# Patient Record
Sex: FEMALE | Race: WHITE | NOT HISPANIC OR LATINO | Employment: UNEMPLOYED | URBAN - METROPOLITAN AREA
[De-identification: names, ages, dates, MRNs, and addresses within clinical notes are randomized per-mention and may not be internally consistent; named-entity substitution may affect disease eponyms.]

---

## 2017-09-14 ENCOUNTER — HISTORICAL (OUTPATIENT)
Dept: RADIOLOGY | Facility: HOSPITAL | Age: 42
End: 2017-09-14

## 2019-02-13 ENCOUNTER — HISTORICAL (OUTPATIENT)
Dept: RADIOLOGY | Facility: HOSPITAL | Age: 44
End: 2019-02-13

## 2019-06-06 ENCOUNTER — HISTORICAL (OUTPATIENT)
Dept: LAB | Facility: HOSPITAL | Age: 44
End: 2019-06-06

## 2019-06-06 LAB
ABS NEUT (OLG): 2.57
ALBUMIN SERPL-MCNC: 3.3 GM/DL (ref 3.4–5)
ALBUMIN/GLOB SERPL: 1.3 RATIO (ref 1.1–2)
ALP SERPL-CCNC: 81 UNIT/L (ref 46–116)
ALT SERPL-CCNC: 17 UNIT/L (ref 12–78)
ANISOCYTOSIS BLD QL SMEAR: NORMAL
AST SERPL-CCNC: 11 UNIT/L (ref 10–37)
BASOPHILS # BLD AUTO: 0.02 X10(3)/MCL
BASOPHILS NFR BLD AUTO: 0.4 %
BILIRUB SERPL-MCNC: 0.2 MG/DL (ref 0.2–1)
BILIRUBIN DIRECT+TOT PNL SERPL-MCNC: 0.07 MG/DL (ref 0–0.2)
BILIRUBIN DIRECT+TOT PNL SERPL-MCNC: 0.13 MG/DL
BUN SERPL-MCNC: 14 MG/DL (ref 7–18)
CALCIUM SERPL-MCNC: 8.2 MG/DL (ref 8.5–10.1)
CHLORIDE SERPL-SCNC: 109 MMOL/L (ref 98–107)
CO2 SERPL-SCNC: 25.9 MMOL/L (ref 21–32)
CREAT SERPL-MCNC: 0.8 MG/DL (ref 0.55–1.02)
EOSINOPHIL # BLD AUTO: 0.18 X10(3)/MCL
EOSINOPHIL NFR BLD AUTO: 3.6 %
ERYTHROCYTE [DISTWIDTH] IN BLOOD BY AUTOMATED COUNT: 16 %
GLOBULIN SER-MCNC: 2.6 GM/DL (ref 2.4–3.5)
GLUCOSE SERPL-MCNC: 85 MG/DL (ref 74–106)
HCT VFR BLD AUTO: 28.4 % (ref 34–46)
HGB BLD-MCNC: 9.1 GM/DL (ref 11.3–15.4)
IMM GRANULOCYTES # BLD AUTO: 0 10*3/UL (ref 0–0.1)
IMM GRANULOCYTES NFR BLD AUTO: 0 % (ref 0–1)
LYMPHOCYTES # BLD AUTO: 1.85 X10(3)/MCL
LYMPHOCYTES NFR BLD AUTO: 37.4 %
MCH RBC QN AUTO: 18.9 PG (ref 27–33)
MCHC RBC AUTO-ENTMCNC: 32 GM/DL (ref 32–35)
MCV RBC AUTO: 59 FL (ref 81–97)
MONOCYTES # BLD AUTO: 0.33 X10(3)/MCL
MONOCYTES NFR BLD AUTO: 6.7 %
NEUTROPHILS # BLD AUTO: 2.57 X10(3)/MCL
NEUTROPHILS NFR BLD AUTO: 51.9 %
OVALOCYTES BLD QL SMEAR: NORMAL
PLATELET # BLD AUTO: 224 X10(3)/MCL (ref 151–368)
PLATELET # BLD EST: ADEQUATE 10*3/UL
PMV BLD AUTO: ABNORMAL FL
POTASSIUM SERPL-SCNC: 3.9 MMOL/L (ref 3.5–5.1)
PROT SERPL-MCNC: 5.9 GM/DL (ref 6.4–8.2)
RBC # BLD AUTO: 4.81 X10(6)/MCL (ref 3.9–5)
SCHISTOCYTES BLD QL AUTO: SLIGHT
SODIUM SERPL-SCNC: 144 MMOL/L (ref 136–145)
TARGETS BLD QL SMEAR: SLIGHT
WBC # SPEC AUTO: 4.95 X10(3)/MCL (ref 3.4–9.2)

## 2019-07-08 ENCOUNTER — HISTORICAL (OUTPATIENT)
Dept: ADMINISTRATIVE | Facility: HOSPITAL | Age: 44
End: 2019-07-08

## 2019-07-09 LAB
ABS NEUT (OLG): 2.44 X10(3)/MCL (ref 2.1–9.2)
BASOPHILS # BLD AUTO: 0 X10(3)/MCL (ref 0–0.2)
BASOPHILS NFR BLD AUTO: 1 %
EOSINOPHIL # BLD AUTO: 0.2 X10(3)/MCL (ref 0–0.9)
EOSINOPHIL NFR BLD AUTO: 4 %
ERYTHROCYTE [DISTWIDTH] IN BLOOD BY AUTOMATED COUNT: 16 % (ref 11.5–17)
HCT VFR BLD AUTO: 28.4 % (ref 37–47)
HGB BLD-MCNC: 8.8 GM/DL (ref 12–16)
LYMPHOCYTES # BLD AUTO: 2 X10(3)/MCL (ref 0.6–4.6)
LYMPHOCYTES NFR BLD AUTO: 39 %
MCH RBC QN AUTO: 18.6 PG (ref 27–31)
MCHC RBC AUTO-ENTMCNC: 31 GM/DL (ref 33–36)
MCV RBC AUTO: 60.2 FL (ref 80–94)
MONOCYTES # BLD AUTO: 0.4 X10(3)/MCL (ref 0.1–1.3)
MONOCYTES NFR BLD AUTO: 7 %
NEUTROPHILS # BLD AUTO: 2.44 X10(3)/MCL (ref 2.1–9.2)
NEUTROPHILS NFR BLD AUTO: 48 %
PLATELET # BLD AUTO: 192 X10(3)/MCL (ref 130–400)
PMV BLD AUTO: ABNORMAL FL (ref 7.4–10.4)
RBC # BLD AUTO: 4.72 X10(6)/MCL (ref 4.2–5.4)
WBC # SPEC AUTO: 5 X10(3)/MCL (ref 4.5–11.5)

## 2020-03-03 ENCOUNTER — HISTORICAL (OUTPATIENT)
Dept: RADIOLOGY | Facility: HOSPITAL | Age: 45
End: 2020-03-03

## 2020-08-17 ENCOUNTER — HISTORICAL (OUTPATIENT)
Dept: RADIOLOGY | Facility: HOSPITAL | Age: 45
End: 2020-08-17

## 2021-06-07 ENCOUNTER — HISTORICAL (OUTPATIENT)
Dept: RADIOLOGY | Facility: HOSPITAL | Age: 46
End: 2021-06-07

## 2022-04-10 ENCOUNTER — HISTORICAL (OUTPATIENT)
Dept: ADMINISTRATIVE | Facility: HOSPITAL | Age: 47
End: 2022-04-10
Payer: COMMERCIAL

## 2022-04-30 VITALS
BODY MASS INDEX: 20.96 KG/M2 | HEIGHT: 59 IN | WEIGHT: 104 LBS | DIASTOLIC BLOOD PRESSURE: 72 MMHG | SYSTOLIC BLOOD PRESSURE: 111 MMHG

## 2022-06-21 ENCOUNTER — HOSPITAL ENCOUNTER (OUTPATIENT)
Dept: RADIOLOGY | Facility: HOSPITAL | Age: 47
Discharge: HOME | End: 2022-06-21
Payer: COMMERCIAL

## 2022-06-21 ENCOUNTER — HOSPITAL ENCOUNTER (OUTPATIENT)
Dept: RADIOLOGY | Facility: HOSPITAL | Age: 47
Discharge: HOME | End: 2022-06-21
Attending: OBSTETRICS & GYNECOLOGY
Payer: COMMERCIAL

## 2022-06-21 DIAGNOSIS — N63.0 LUMP OR MASS IN BREAST: ICD-10-CM

## 2022-06-21 DIAGNOSIS — N64.4 MASTODYNIA: ICD-10-CM

## 2022-06-21 PROCEDURE — 77062 BREAST TOMOSYNTHESIS BI: CPT | Mod: TC

## 2022-06-21 PROCEDURE — 76642 ULTRASOUND BREAST LIMITED: CPT | Mod: 26,LT,, | Performed by: RADIOLOGY

## 2022-06-21 PROCEDURE — 77062 MAMMO DIGITAL DIAGNOSTIC BILAT WITH TOMO: ICD-10-PCS | Mod: 26,,, | Performed by: RADIOLOGY

## 2022-06-21 PROCEDURE — 77066 MAMMO DIGITAL DIAGNOSTIC BILAT WITH TOMO: ICD-10-PCS | Mod: 26,,, | Performed by: RADIOLOGY

## 2022-06-21 PROCEDURE — 77066 DX MAMMO INCL CAD BI: CPT | Mod: 26,,, | Performed by: RADIOLOGY

## 2022-06-21 PROCEDURE — 77062 BREAST TOMOSYNTHESIS BI: CPT | Mod: 26,,, | Performed by: RADIOLOGY

## 2022-06-21 PROCEDURE — 76642 US BREAST LEFT LIMITED: ICD-10-PCS | Mod: 26,LT,, | Performed by: RADIOLOGY

## 2022-06-21 PROCEDURE — 76642 ULTRASOUND BREAST LIMITED: CPT | Mod: TC,LT

## 2022-06-30 RX ORDER — FOLIC ACID 1 MG/1
1000 TABLET ORAL DAILY
COMMUNITY
Start: 2022-05-03 | End: 2023-01-16 | Stop reason: SDUPTHER

## 2022-06-30 RX ORDER — GALCANEZUMAB 120 MG/ML
INJECTION, SOLUTION SUBCUTANEOUS
COMMUNITY
Start: 2022-05-31 | End: 2023-01-12 | Stop reason: SDUPTHER

## 2022-06-30 RX ORDER — CALCIUM CARBONATE/VITAMIN D3 600 MG-10
30 TABLET ORAL
COMMUNITY
Start: 2021-08-04 | End: 2023-12-18

## 2022-06-30 RX ORDER — ESTRADIOL 2 MG/1
TABLET ORAL
COMMUNITY
Start: 2022-06-20

## 2022-08-04 ENCOUNTER — OFFICE VISIT (OUTPATIENT)
Dept: HEMATOLOGY/ONCOLOGY | Facility: CLINIC | Age: 47
End: 2022-08-04
Payer: COMMERCIAL

## 2022-08-04 VITALS
SYSTOLIC BLOOD PRESSURE: 104 MMHG | TEMPERATURE: 98 F | DIASTOLIC BLOOD PRESSURE: 60 MMHG | OXYGEN SATURATION: 99 % | WEIGHT: 106.19 LBS | RESPIRATION RATE: 20 BRPM | HEART RATE: 68 BPM | HEIGHT: 58 IN | BODY MASS INDEX: 22.29 KG/M2

## 2022-08-04 DIAGNOSIS — D56.9 THALASSEMIA, UNSPECIFIED TYPE: Primary | ICD-10-CM

## 2022-08-04 PROCEDURE — 3078F PR MOST RECENT DIASTOLIC BLOOD PRESSURE < 80 MM HG: ICD-10-PCS | Mod: CPTII,,, | Performed by: NURSE PRACTITIONER

## 2022-08-04 PROCEDURE — 3008F BODY MASS INDEX DOCD: CPT | Mod: CPTII,,, | Performed by: NURSE PRACTITIONER

## 2022-08-04 PROCEDURE — 99214 OFFICE O/P EST MOD 30 MIN: CPT | Mod: ,,, | Performed by: NURSE PRACTITIONER

## 2022-08-04 PROCEDURE — 3078F DIAST BP <80 MM HG: CPT | Mod: CPTII,,, | Performed by: NURSE PRACTITIONER

## 2022-08-04 PROCEDURE — 3074F SYST BP LT 130 MM HG: CPT | Mod: CPTII,,, | Performed by: NURSE PRACTITIONER

## 2022-08-04 PROCEDURE — 3074F PR MOST RECENT SYSTOLIC BLOOD PRESSURE < 130 MM HG: ICD-10-PCS | Mod: CPTII,,, | Performed by: NURSE PRACTITIONER

## 2022-08-04 PROCEDURE — 1159F PR MEDICATION LIST DOCUMENTED IN MEDICAL RECORD: ICD-10-PCS | Mod: CPTII,,, | Performed by: NURSE PRACTITIONER

## 2022-08-04 PROCEDURE — 3008F PR BODY MASS INDEX (BMI) DOCUMENTED: ICD-10-PCS | Mod: CPTII,,, | Performed by: NURSE PRACTITIONER

## 2022-08-04 PROCEDURE — 99214 PR OFFICE/OUTPT VISIT, EST, LEVL IV, 30-39 MIN: ICD-10-PCS | Mod: ,,, | Performed by: NURSE PRACTITIONER

## 2022-08-04 PROCEDURE — 1159F MED LIST DOCD IN RCRD: CPT | Mod: CPTII,,, | Performed by: NURSE PRACTITIONER

## 2022-08-04 NOTE — PROGRESS NOTES
[facility]    PATIENT: Jarred Jason  MRN: [id number]  DATE: 8/4/2022    Diagnosis: Beta Thalessemia    Chief Complaint: Results    Oncologic History:    Iron deficiency anemia--   Bone marrow biopsy 7/9/19-- 35% cellularity with all lines maturing. absent sparse iron, cytogenetic : normal.  Microcytic anemia-- with headaches, PICA, very poor nutrition, and history of heavy menses in past.    Beta Thalassemia MINOR- by electrophoresis 7/23/19    Current Therapy: Folic acid 1 mg added 7/23/19    Treatment History: Injectafer 750 mg IV weekly x 2 with solumedrol IVP-- initiated 6/11/19, 6/18/19, 7/25/19, 8/1/19    Clinical History:   Mrs. Jason had a hysterectomy in 2002 ( at age 26) for endometriosis and very heavy menses. She was given oral iron and prenatal vitamin but these were stopped when she developed kidney stones. She had two children prior to her surgery with no hemorrhaging noted.    Over the years, she became dependent on Dr. Pepper -- 4-5 a day and would raise her family with missed meals, constant activity, but no bleeding.  One year ago she began to have headaches (generalized, every other day, and moderately severe). The past year the headaches are daily and much worse in intensity but still generalized.. Her diet is poor and she eats maybe one small meal a day.    Since April, 2019, she became tired and unable to continue with household chores. Bone marrow biopsy showed iron deficiency but her nutrition (very poor) is contributing. Migraine headaches and stress also contribute.  She was found to have iron deficiency but a fully functioning bone marrow. She was also found to have beta thalassemia.  EGD 9/30/19 showed non erosive proximal gastritis and HPylori. Treatment was initiated. colonoscopy on same day found a 1.5 cm ascending colon polyp.     Subjective:     Interval History:  8/4/2022:  Ms. Jason presents to clinic today for annual evaluation for Beta  Thalassemia. Iron deficiency anemia.   Continues to take folic acid 1 mg daily.   States she has been doing well. Good energy, no SOB, weakness, dizziness, rapid heart beat or bleeding.     8/2/2021: Anesia for follow-up of beta thalassemia. She also had a history of iron deficiency anemia which is resolved without any ongoing losses. She has a history of some back pain in the lumbar area which has been present for about 2 weeks. She also notes a intermittent pins-and-needles sensation and some ankle pain in her feet. She does admit to wearing flip-flops most of the time. Otherwise she feels well. Her migraines are much improved. She is taking her folic acid.    1/28/2021: Patient presents to clinic for follow-up visit today. Overall feels well. States she is compliant with folic acid 1 mg daily, tolerating well. States SOB with exertion, unchanged from previous. She denies any abnormal bruising or bleeding. Denies fever, chills, night sweats. Appetite remains fair and weight stable. Laboratory testing reviewed and discussed. No further questions or concerns.    Past Medical History:   Diagnosis Date    Migraines      Past Surgical History:   Procedure Laterality Date    AUGMENTATION OF BREAST      BONE MARROW BIOPSY W/ ASPIRATION  07/08/2019    COLONOSCOPY  09/30/2019    ESOPHAGOGASTRODUODENOSCOPY  09/30/2019    with biopsy    HYSTERECTOMY       Family History   Problem Relation Age of Onset    Hypertension Mother     Diabetes Mother     Diabetes type II Brother     Hypertension Brother        Social History:  reports that she has quit smoking. Her smoking use included cigarettes. She has never used smokeless tobacco. She reports previous alcohol use. She reports that she does not use drugs.    Allergies:  Review of patient's allergies indicates:  No Known Allergies    Medications:  Current Outpatient Medications   Medication Sig Dispense Refill    EMGALITY  mg/mL PnIj INJECT 120 MG UNDER THE SKIN  ONCE A MONTH      estradioL (ESTRACE) 2 MG tablet       folic acid (FOLVITE) 1 MG tablet Take 1,000 mcg by mouth once daily.      vitamin E 100 UNIT capsule Take 100 Int'l Units by mouth.      zinc gluconate 30 mg Tab Take 30 mg by mouth.      biotin 300 mcg Tab Take 300 mcg by mouth.       No current facility-administered medications for this visit.       Review of Systems:   Constitutional: no weight gain, no weight loss, no fatigue, no fever, no chills, no weakness, no trouble sleeping.  Eyes: no vision loss/changes, no pain, no redness, no blurry or double vision,   Head: no headache, no head injury, no neck pain.   Neck: no lumps, no swollen glands, no stiffness.  Ears: no decreased hearing, no ringing, no earache, no drainage.   Nose: no stuffiness, no discharge, no itching, no hay fever, no nosebleeds,   Throat: no sore tongue, no dry mouth, no sore throat, no hoarseness, no thrush, no non-healing sores.  Cardiovascular: no chest pain or discomfort, no tightness, no palpitations, no SOB with activity, no difficulty breathing while supine, no swelling, no sudden awakening from sleep with SOB.  Vascular: no calf pain with walking, no leg cramping.  Respiratory: no cough, no sputum, no coughing up blood, no SOB, no wheezing, no painful breathing.  Gastrointestinal: no swallowing difficulty, no heartburn, no change in appetite, no nausea, no change in bowel habits, no rectal bleeding, no constipation, no diarrhea, no yellow eyes or skin. no abdominal pain  Urinary: no frequency, no urgency, no burning or pain, no blood in urine, no change in urinary strength.  Musculoskeletal: no muscle or joint pain, no stiffness, no back pain, no redness of joints, no swelling of joints.   Skin: no rashes, no lumps, no itching, no dryness, color normal for ethnicity, no hair or nail changes.  Neurologic: no dizziness, no fainting, no seizures, no weakness, no numbness, tingling,in feet at times no tremors.  Psychiatric: no  "nervousness, no stress, no depression, no memory loss.  Endocrine: no heat or cold intolerance, no sweating, no frequent urination, no thirst, no change in appetite.  Hematologic: no ease of bruising, no ease of bleeding.     ECOG Performance Status: 0   Objective:      Vitals:   Vitals:    08/04/22 1302   BP: 104/60   BP Location: Right arm   Patient Position: Sitting   BP Method: Medium (Automatic)   Pulse: 68   Resp: 20   Temp: 98.2 °F (36.8 °C)   TempSrc: Oral   SpO2: 99%   Weight: 48.2 kg (106 lb 3.2 oz)   Height: 4' 10" (1.473 m)     BMI: Body mass index is 22.2 kg/m².    Physical Exam:  General: Alert and oriented. Well nourished, groomed. NAD  Eye: PERRL, clear conjunctiva, anicteric sclera.   HENT: Moist oropharynx. No petechiae.   Neck: no thyromegaly or lymphadenopathy  Respiratory: clear to auscultation bilaterally  Cardiovascular: regular rate and rhythm without murmurs, gallops or rubs  Gastrointestinal: soft, non-tender, non-distended with normal bowel sounds, without masses to palpation  Genitourinary: no CVA tenderness to palpation  Musculoskeletal: full range of motion of all extremities/spine without limitation or discomfort  Integumentary: no rashes or skin lesions present  Neurologic: cranial nerves intact, no signs of peripheral neurological deficit, motor/sensory function intact  Lymphatics: No cervical, axillary, or inguinal nodes.    Laboratory results:   8/1/2022: WBC 5.20, Hgb 9.9, HCT 29.5, MCV 60.1, MCH 20.2, polt 265, ANC 2.7, calcium 8.3     Assessment:   1. Thalassemia D56.9   Patient feels well. Hemoglobin/HCT(9.9/29.5) stable range for patient. Her MCV is stable at 60.1  Continued folic acid 1 mg daily.     2. Mild hypocalcemia. Patient take a 500 mg tablet of calcium. Advised she can take 2 500 mg tablets daily.      Plan:   # Continue folic acid one mg daily.   Continue calcium, one tablet twice daily.     RTC one year with labs done prior, advised to call if any concerns in the " interim.       Norma Ray, NP-C

## 2022-12-19 ENCOUNTER — OFFICE VISIT (OUTPATIENT)
Dept: NEUROLOGY | Facility: CLINIC | Age: 47
End: 2022-12-19
Payer: COMMERCIAL

## 2022-12-19 VITALS
BODY MASS INDEX: 23.09 KG/M2 | WEIGHT: 110 LBS | SYSTOLIC BLOOD PRESSURE: 104 MMHG | DIASTOLIC BLOOD PRESSURE: 68 MMHG | HEIGHT: 58 IN

## 2022-12-19 DIAGNOSIS — Z65.9 OTHER SOCIAL STRESSOR: ICD-10-CM

## 2022-12-19 DIAGNOSIS — G47.00 INSOMNIA, UNSPECIFIED TYPE: ICD-10-CM

## 2022-12-19 DIAGNOSIS — G43.709 CHRONIC MIGRAINE WITHOUT AURA WITHOUT STATUS MIGRAINOSUS, NOT INTRACTABLE: Primary | ICD-10-CM

## 2022-12-19 PROCEDURE — 3074F PR MOST RECENT SYSTOLIC BLOOD PRESSURE < 130 MM HG: ICD-10-PCS | Mod: CPTII,S$GLB,, | Performed by: NURSE PRACTITIONER

## 2022-12-19 PROCEDURE — 3078F DIAST BP <80 MM HG: CPT | Mod: CPTII,S$GLB,, | Performed by: NURSE PRACTITIONER

## 2022-12-19 PROCEDURE — 3074F SYST BP LT 130 MM HG: CPT | Mod: CPTII,S$GLB,, | Performed by: NURSE PRACTITIONER

## 2022-12-19 PROCEDURE — 99999 PR PBB SHADOW E&M-EST. PATIENT-LVL III: ICD-10-PCS | Mod: PBBFAC,,, | Performed by: NURSE PRACTITIONER

## 2022-12-19 PROCEDURE — 99214 OFFICE O/P EST MOD 30 MIN: CPT | Mod: S$GLB,,, | Performed by: NURSE PRACTITIONER

## 2022-12-19 PROCEDURE — 1159F MED LIST DOCD IN RCRD: CPT | Mod: CPTII,S$GLB,, | Performed by: NURSE PRACTITIONER

## 2022-12-19 PROCEDURE — 3078F PR MOST RECENT DIASTOLIC BLOOD PRESSURE < 80 MM HG: ICD-10-PCS | Mod: CPTII,S$GLB,, | Performed by: NURSE PRACTITIONER

## 2022-12-19 PROCEDURE — 3008F PR BODY MASS INDEX (BMI) DOCUMENTED: ICD-10-PCS | Mod: CPTII,S$GLB,, | Performed by: NURSE PRACTITIONER

## 2022-12-19 PROCEDURE — 99214 PR OFFICE/OUTPT VISIT, EST, LEVL IV, 30-39 MIN: ICD-10-PCS | Mod: S$GLB,,, | Performed by: NURSE PRACTITIONER

## 2022-12-19 PROCEDURE — 99999 PR PBB SHADOW E&M-EST. PATIENT-LVL III: CPT | Mod: PBBFAC,,, | Performed by: NURSE PRACTITIONER

## 2022-12-19 PROCEDURE — 3008F BODY MASS INDEX DOCD: CPT | Mod: CPTII,S$GLB,, | Performed by: NURSE PRACTITIONER

## 2022-12-19 PROCEDURE — 1159F PR MEDICATION LIST DOCUMENTED IN MEDICAL RECORD: ICD-10-PCS | Mod: CPTII,S$GLB,, | Performed by: NURSE PRACTITIONER

## 2022-12-19 RX ORDER — MIRTAZAPINE 15 MG/1
15 TABLET, FILM COATED ORAL NIGHTLY
Qty: 30 TABLET | Refills: 5 | Status: SHIPPED | OUTPATIENT
Start: 2022-12-19 | End: 2023-01-09

## 2022-12-19 RX ORDER — SUMATRIPTAN SUCCINATE 100 MG/1
100 TABLET ORAL
Qty: 9 TABLET | Refills: 5 | Status: SHIPPED | OUTPATIENT
Start: 2022-12-19 | End: 2023-11-06 | Stop reason: SDUPTHER

## 2022-12-19 NOTE — PROGRESS NOTES
Established Patient   SUBJECTIVE:  Patient ID: Jarred aJson 47 y.o.  Past Medical History:   Diagnosis Date    Migraines      Past Surgical History:   Procedure Laterality Date    AUGMENTATION OF BREAST      BONE MARROW BIOPSY W/ ASPIRATION  07/08/2019    COLONOSCOPY  09/30/2019    ESOPHAGOGASTRODUODENOSCOPY  09/30/2019    with biopsy    HYSTERECTOMY       Family History   Problem Relation Age of Onset    Hypertension Mother     Diabetes Mother     Diabetes type II Brother     Hypertension Brother      Social History     Socioeconomic History    Marital status:    Tobacco Use    Smoking status: Former     Types: Cigarettes    Smokeless tobacco: Never   Substance and Sexual Activity    Alcohol use: Not Currently     Comment: Not often     Drug use: Never     Review of patient's allergies indicates:  No Known Allergies    Chief Complaint: Migraine f/u    History of Present Illness:    Pt states she gets about 3 migraines a month. States she gets a throbbing-pounding pain in the back of her head that radiates all over head and behind her eyes. Pt has n/v, phono-photosensitivity.     Taking Excedrin migraine prn, which hasnt been helpful with  relieving pain    States she is under a lot of stress, pt is crying. States her mom and  have been ill and it's a lot for her.     Having difficulty sleeping      Review of Systems - as per HPI, otherwise pertinent systems review is negative.      Current Medications:    Current Outpatient Medications:     EMGALITY  mg/mL PnIj, INJECT 120 MG UNDER THE SKIN ONCE A MONTH, Disp: , Rfl:     estradioL (ESTRACE) 2 MG tablet, , Disp: , Rfl:     folic acid (FOLVITE) 1 MG tablet, Take 1,000 mcg by mouth once daily., Disp: , Rfl:     vitamin E 100 UNIT capsule, Take 100 Int'l Units by mouth., Disp: , Rfl:     zinc gluconate 30 mg Tab, Take 30 mg by mouth., Disp: , Rfl:     biotin 300 mcg Tab, Take 300 mcg by mouth., Disp: , Rfl:     OBJECTIVE:  Vitals:  /68  "  Ht 4' 10" (1.473 m)   Wt 49.9 kg (110 lb)   BMI 22.99 kg/m²      Physical Exam:  Constitutional: she appears well-developed and well-nourished. she is well groomed.    Head: Normocephalic and atraumatic  Resp: Respiratory effort is normal.   Cardiac: RRR  Musculoskeletal: Normal range of motion.   Skin: Skin is warm and dry.  Psychiatric: Normal mood and affect.     Neuro exam:    The patient is alert and oriented   Normal attention and concentration  Speech is normal   Visual fields are full to confrontation testing.   CN 8 - hearing is grossly normal  Motor - grossly normal  Gait - unassisted; posture upright. gait is steady with normal steps      Review of Data:   Prior notes reviewed   Labs:  No visits with results within 6 Month(s) from this visit.   Latest known visit with results is:   Historical on 07/08/2019   Component Date Value Ref Range Status    Abs Neut 07/09/2019 2.44  2.10 - 9.20 x10(3)/mcL Final    MCV 07/09/2019 60.2 (L)  80.0 - 94.0 fL Final    Hct 07/09/2019 28.4 (L)  37.0 - 47.0 % Final    MCHC 07/09/2019 31.0 (L)  33.0 - 36.0 gm/dL Final    MCH 07/09/2019 18.6 (L)  27.0 - 31.0 pg Final    Hgb 07/09/2019 8.8 (L)  12.0 - 16.0 gm/dL Final    MPV 07/09/2019 n/a  7.4 - 10.4 fL Final    RBC 07/09/2019 4.72  4.20 - 5.40 x10(6)/mcL Final    RDW 07/09/2019 16.0  11.5 - 17.0 % Final    WBC 07/09/2019 5.0  4.5 - 11.5 x10(3)/mcL Final    Platelet 07/09/2019 192  130 - 400 x10(3)/mcL Final    Eos # 07/09/2019 0.2  0.0 - 0.9 x10(3)/mcL Final    Lymph # 07/09/2019 2.0  0.6 - 4.6 x10(3)/mcL Final    Basophil % 07/09/2019 1  % Final    Baso # 07/09/2019 0.0  0.0 - 0.2 x10(3)/mcL Final    Mono # 07/09/2019 0.4  0.1 - 1.3 x10(3)/mcL Final    Neut # 07/09/2019 2.44  2.10 - 9.20 x10(3)/mcL Final    Eos % 07/09/2019 4  % Final    Lymph % 07/09/2019 39  % Final    Mono % 07/09/2019 7  % Final    Neut % 07/09/2019 48  % Final     Imaging:  No results found for this or any previous visit.      ASSESSMENT " /PLAN:    Problem List Items Addressed This Visit          Neuro    Chronic migraine without aura without status migrainosus, not intractable - Primary    Continue Emgality monthly    PRN Sumatriptan         Other    Insomnia    Start Remeron 15mg, 1 Qhs  Anticipated response and possible side effects of new medication discussed. Pt denies questions at this time. Instructed pt to call with any questions or concerns       Other social stressor         Items discussed include acute and/or chronic neurological, sleep, or other issues and their attendant differential diagnoses.  Potential for additional testing, treatment options, and prognosis also discussed.   Questions and concerns were sought and answered to the patient's stated verbal satisfaction.    The patient verbalizes understanding and agreement with the above stated treatment plan.     __single dx __*_multiple issues/ diagnoses  __ low _*_mod ___ high complexity of data  __low _*_mod ___ high risks     Medical Decision Making (MDM) used for CPT choice:  ___low  _*__moderate  ____high       CHEIKH Padilla  [facility]  [phone]

## 2023-01-09 ENCOUNTER — TELEPHONE (OUTPATIENT)
Dept: NEUROLOGY | Facility: CLINIC | Age: 48
End: 2023-01-09
Payer: COMMERCIAL

## 2023-01-09 DIAGNOSIS — G47.00 INSOMNIA, UNSPECIFIED TYPE: Primary | ICD-10-CM

## 2023-01-09 RX ORDER — MIRTAZAPINE 30 MG/1
30 TABLET, FILM COATED ORAL NIGHTLY
Qty: 30 TABLET | Refills: 0 | Status: SHIPPED | OUTPATIENT
Start: 2023-01-09 | End: 2023-02-20 | Stop reason: SDUPTHER

## 2023-01-09 NOTE — TELEPHONE ENCOUNTER
Pt states she was put on this medication to help with sleeping and anxiety.   Pt reports it has helped with anxiety, but not sleep.  Reports she has been waking up at 3 or 4 in the morning since November 2022.  Pt states seeking advise.   Reports she is exhausted and agitated.     Medication: Mirtazapine 15 mg    Pharmacy: [facility] / [place]    Last Appointment: 12/19/22    Next Appointment: 12/18/23

## 2023-01-09 NOTE — TELEPHONE ENCOUNTER
Called pt; RICHIE left    Instructed pt to increase Remeron to 15mg, 2 Qhs  Will send prescription for Remeron 30mg, 1 Qhs to pharmacy

## 2023-01-12 DIAGNOSIS — G43.709 CHRONIC MIGRAINE WITHOUT AURA WITHOUT STATUS MIGRAINOSUS, NOT INTRACTABLE: Primary | ICD-10-CM

## 2023-01-12 RX ORDER — GALCANEZUMAB 120 MG/ML
INJECTION, SOLUTION SUBCUTANEOUS
Qty: 1 ML | Refills: 5 | Status: SHIPPED | OUTPATIENT
Start: 2023-01-12 | End: 2023-07-03

## 2023-01-16 DIAGNOSIS — D56.9 THALASSEMIA, UNSPECIFIED TYPE: Primary | ICD-10-CM

## 2023-01-18 RX ORDER — FOLIC ACID 1 MG/1
1000 TABLET ORAL DAILY
Qty: 30 TABLET | Refills: 5 | Status: SHIPPED | OUTPATIENT
Start: 2023-01-18

## 2023-02-20 DIAGNOSIS — G47.00 INSOMNIA, UNSPECIFIED TYPE: ICD-10-CM

## 2023-02-20 RX ORDER — MIRTAZAPINE 30 MG/1
30 TABLET, FILM COATED ORAL NIGHTLY
Qty: 30 TABLET | Refills: 0 | Status: SHIPPED | OUTPATIENT
Start: 2023-02-20 | End: 2023-03-30 | Stop reason: SDUPTHER

## 2023-03-30 DIAGNOSIS — G47.00 INSOMNIA, UNSPECIFIED TYPE: ICD-10-CM

## 2023-03-30 RX ORDER — MIRTAZAPINE 30 MG/1
30 TABLET, FILM COATED ORAL NIGHTLY
Qty: 30 TABLET | Refills: 5 | Status: SHIPPED | OUTPATIENT
Start: 2023-03-30 | End: 2023-12-18

## 2023-06-12 DIAGNOSIS — Z12.31 ENCOUNTER FOR SCREENING MAMMOGRAM FOR MALIGNANT NEOPLASM OF BREAST: Primary | ICD-10-CM

## 2023-06-12 DIAGNOSIS — Z00.00 WELLNESS EXAMINATION: ICD-10-CM

## 2023-06-27 ENCOUNTER — HOSPITAL ENCOUNTER (OUTPATIENT)
Dept: RADIOLOGY | Facility: HOSPITAL | Age: 48
Discharge: HOME | End: 2023-06-27
Attending: OBSTETRICS & GYNECOLOGY
Payer: COMMERCIAL

## 2023-06-27 DIAGNOSIS — Z00.00 WELLNESS EXAMINATION: ICD-10-CM

## 2023-06-27 DIAGNOSIS — Z12.31 ENCOUNTER FOR SCREENING MAMMOGRAM FOR MALIGNANT NEOPLASM OF BREAST: ICD-10-CM

## 2023-06-27 PROCEDURE — 77063 BREAST TOMOSYNTHESIS BI: CPT | Mod: 26,,, | Performed by: RADIOLOGY

## 2023-06-27 PROCEDURE — 77067 SCR MAMMO BI INCL CAD: CPT | Mod: TC

## 2023-06-27 PROCEDURE — 77063 MAMMO DIGITAL SCREENING BILAT WITH TOMO: ICD-10-PCS | Mod: 26,,, | Performed by: RADIOLOGY

## 2023-06-27 PROCEDURE — 77067 SCR MAMMO BI INCL CAD: CPT | Mod: 26,,, | Performed by: RADIOLOGY

## 2023-06-27 PROCEDURE — 77067 MAMMO DIGITAL SCREENING BILAT WITH TOMO: ICD-10-PCS | Mod: 26,,, | Performed by: RADIOLOGY

## 2023-06-30 DIAGNOSIS — G43.709 CHRONIC MIGRAINE WITHOUT AURA WITHOUT STATUS MIGRAINOSUS, NOT INTRACTABLE: ICD-10-CM

## 2023-07-03 RX ORDER — GALCANEZUMAB 120 MG/ML
INJECTION, SOLUTION SUBCUTANEOUS
Qty: 1 ML | Refills: 5 | Status: SHIPPED | OUTPATIENT
Start: 2023-07-03 | End: 2023-12-18 | Stop reason: SDUPTHER

## 2023-11-06 ENCOUNTER — TELEPHONE (OUTPATIENT)
Dept: NEUROLOGY | Facility: CLINIC | Age: 48
End: 2023-11-06
Payer: COMMERCIAL

## 2023-11-06 DIAGNOSIS — G43.709 CHRONIC MIGRAINE WITHOUT AURA WITHOUT STATUS MIGRAINOSUS, NOT INTRACTABLE: ICD-10-CM

## 2023-11-06 RX ORDER — SUMATRIPTAN SUCCINATE 100 MG/1
100 TABLET ORAL
Qty: 9 TABLET | Refills: 5 | Status: SHIPPED | OUTPATIENT
Start: 2023-11-06 | End: 2023-12-18 | Stop reason: SDUPTHER

## 2023-12-18 ENCOUNTER — OFFICE VISIT (OUTPATIENT)
Dept: NEUROLOGY | Facility: CLINIC | Age: 48
End: 2023-12-18
Payer: COMMERCIAL

## 2023-12-18 VITALS
WEIGHT: 122.81 LBS | HEART RATE: 54 BPM | HEIGHT: 58 IN | SYSTOLIC BLOOD PRESSURE: 122 MMHG | DIASTOLIC BLOOD PRESSURE: 82 MMHG | BODY MASS INDEX: 25.78 KG/M2

## 2023-12-18 DIAGNOSIS — G43.709 CHRONIC MIGRAINE WITHOUT AURA WITHOUT STATUS MIGRAINOSUS, NOT INTRACTABLE: Primary | ICD-10-CM

## 2023-12-18 PROCEDURE — 3079F PR MOST RECENT DIASTOLIC BLOOD PRESSURE 80-89 MM HG: ICD-10-PCS | Mod: CPTII,S$GLB,, | Performed by: NURSE PRACTITIONER

## 2023-12-18 PROCEDURE — 3074F PR MOST RECENT SYSTOLIC BLOOD PRESSURE < 130 MM HG: ICD-10-PCS | Mod: CPTII,S$GLB,, | Performed by: NURSE PRACTITIONER

## 2023-12-18 PROCEDURE — 99213 OFFICE O/P EST LOW 20 MIN: CPT | Mod: S$GLB,,, | Performed by: NURSE PRACTITIONER

## 2023-12-18 PROCEDURE — 3008F BODY MASS INDEX DOCD: CPT | Mod: CPTII,S$GLB,, | Performed by: NURSE PRACTITIONER

## 2023-12-18 PROCEDURE — 3074F SYST BP LT 130 MM HG: CPT | Mod: CPTII,S$GLB,, | Performed by: NURSE PRACTITIONER

## 2023-12-18 PROCEDURE — 1159F PR MEDICATION LIST DOCUMENTED IN MEDICAL RECORD: ICD-10-PCS | Mod: CPTII,S$GLB,, | Performed by: NURSE PRACTITIONER

## 2023-12-18 PROCEDURE — 1159F MED LIST DOCD IN RCRD: CPT | Mod: CPTII,S$GLB,, | Performed by: NURSE PRACTITIONER

## 2023-12-18 PROCEDURE — 99213 PR OFFICE/OUTPT VISIT, EST, LEVL III, 20-29 MIN: ICD-10-PCS | Mod: S$GLB,,, | Performed by: NURSE PRACTITIONER

## 2023-12-18 PROCEDURE — 3079F DIAST BP 80-89 MM HG: CPT | Mod: CPTII,S$GLB,, | Performed by: NURSE PRACTITIONER

## 2023-12-18 PROCEDURE — 3008F PR BODY MASS INDEX (BMI) DOCUMENTED: ICD-10-PCS | Mod: CPTII,S$GLB,, | Performed by: NURSE PRACTITIONER

## 2023-12-18 PROCEDURE — 99999 PR PBB SHADOW E&M-EST. PATIENT-LVL III: CPT | Mod: PBBFAC,,, | Performed by: NURSE PRACTITIONER

## 2023-12-18 PROCEDURE — 99999 PR PBB SHADOW E&M-EST. PATIENT-LVL III: ICD-10-PCS | Mod: PBBFAC,,, | Performed by: NURSE PRACTITIONER

## 2023-12-18 RX ORDER — SUMATRIPTAN SUCCINATE 100 MG/1
100 TABLET ORAL
Qty: 9 TABLET | Refills: 5 | Status: SHIPPED | OUTPATIENT
Start: 2023-12-18 | End: 2024-01-17

## 2023-12-18 RX ORDER — TRAZODONE HYDROCHLORIDE 150 MG/1
150 TABLET ORAL NIGHTLY
COMMUNITY
Start: 2023-11-27

## 2023-12-18 RX ORDER — GALCANEZUMAB 120 MG/ML
INJECTION, SOLUTION SUBCUTANEOUS
Qty: 1 ML | Refills: 11 | Status: SHIPPED | OUTPATIENT
Start: 2023-12-18

## 2023-12-18 NOTE — PROGRESS NOTES
"Neurology  Established Patient   SUBJECTIVE:    Patient ID: Jarred Jason , 48 y.o.    Past Medical History:   Diagnosis Date    Migraines        Past Surgical History:   Procedure Laterality Date    AUGMENTATION OF BREAST      BONE MARROW BIOPSY W/ ASPIRATION  07/08/2019    COLONOSCOPY  09/30/2019    ESOPHAGOGASTRODUODENOSCOPY  09/30/2019    with biopsy    HYSTERECTOMY         Review of patient's allergies indicates:  No Known Allergies    Chief Complaint:  Migraine f/u    History of Present Illness:   Here for a 1 year follow up for migraines.     Reports having 2-3 migraines a month, admits to intensity of migraines improving. However states better than they were.     Continues to take Emgality monthly     Takes Sumatriptan PRN migraine, which typically is helpful in relieving pain      Review of Systems - as per HPI, otherwise a balanced 10 systems review is negative.      Current Medications:  Current Outpatient Medications   Medication Instructions    EMGALITY  mg/mL PnIj ADMINISTER 1 ML UNDER THE SKIN 1 TIME A MONTH    estradioL (ESTRACE) 2 MG tablet No dose, route, or frequency recorded.    folic acid (FOLVITE) 1,000 mcg, Oral, Daily    sumatriptan (IMITREX) 100 mg, Oral, Every 2 hours PRN    traZODone (DESYREL) 150 mg, Oral, Nightly    vitamin E 100 UNIT capsule 100 Int'l Units, Oral         OBJECTIVE:    Vitals:  /82 (BP Location: Left arm, Patient Position: Sitting, BP Method: Medium (Automatic))   Pulse (!) 54   Ht 4' 10" (1.473 m)   Wt 55.7 kg (122 lb 12.7 oz)   BMI 25.66 kg/m²      Physical Exam:  Constitutional  she appears well-developed and well-nourished. she is well groomed.    Accompanied by - self  Appearance - well appearing, no apparent distress, unassisted  Heart - RRR auscultated without murmur  Lungs - CTA   Skin- no obvious lesions noted    Neurologic  Cortical function - The patient is alert, attentive, and oriented  Speech - clear   Cranial nerves:  CN 3, 4, 6 EOMs " - normal. No ptosis or lateral gaze deviation  CN 7 - no face asymmetry; normal eye closure and smile  CN 8 - hearing is grossly normal  Gait - unassisted; posture upright. gait is steady with normal steps           ASSESSMENT /PLAN:    Problem List Items Addressed This Visit          Neuro    Chronic migraine without aura without status migrainosus, not intractable - Primary    Continue Emgality monthly and prn Imitrex    F/u in 1 yr       Questions and concerns were sought and answered to the patient's stated verbal satisfaction.    The patient verbalizes understanding and agreement with the above stated treatment plan.   Items discussed include acute and/or chronic neurological, sleep, or other issues and their attendant differential diagnoses.  Potential for additional testing, treatment options, and prognosis also discussed.    _*__single dx ___multiple issues/ diagnoses  ___ low _*_ mod ___ high complexity of data  _*__low __mod ___ high risks     Medical Decision Making (MDM) used for CPT choice:  _*__low  ___moderate  ____high        CHEIKH Padilla  [facility]  [phone]

## 2024-07-01 ENCOUNTER — HOSPITAL ENCOUNTER (OUTPATIENT)
Dept: RADIOLOGY | Facility: HOSPITAL | Age: 49
Discharge: HOME | End: 2024-07-01
Attending: STUDENT IN AN ORGANIZED HEALTH CARE EDUCATION/TRAINING PROGRAM
Payer: COMMERCIAL

## 2024-07-01 DIAGNOSIS — Z12.31 ENCOUNTER FOR SCREENING MAMMOGRAM FOR BREAST CANCER: ICD-10-CM

## 2024-07-01 PROCEDURE — 77063 BREAST TOMOSYNTHESIS BI: CPT | Mod: 26,,, | Performed by: RADIOLOGY

## 2024-07-01 PROCEDURE — 77067 SCR MAMMO BI INCL CAD: CPT | Mod: 26,,, | Performed by: RADIOLOGY

## 2024-07-01 PROCEDURE — 77067 SCR MAMMO BI INCL CAD: CPT | Mod: TC

## 2024-12-09 DIAGNOSIS — G43.709 CHRONIC MIGRAINE WITHOUT AURA WITHOUT STATUS MIGRAINOSUS, NOT INTRACTABLE: ICD-10-CM

## 2024-12-09 RX ORDER — SUMATRIPTAN SUCCINATE 100 MG/1
TABLET ORAL
Qty: 9 TABLET | Refills: 5 | Status: SHIPPED | OUTPATIENT
Start: 2024-12-09

## 2024-12-09 RX ORDER — GALCANEZUMAB 120 MG/ML
INJECTION, SOLUTION SUBCUTANEOUS
Qty: 1 ML | Refills: 11 | Status: SHIPPED | OUTPATIENT
Start: 2024-12-09

## 2024-12-20 DIAGNOSIS — G43.709 CHRONIC MIGRAINE WITHOUT AURA WITHOUT STATUS MIGRAINOSUS, NOT INTRACTABLE: ICD-10-CM

## 2024-12-21 RX ORDER — GALCANEZUMAB 120 MG/ML
INJECTION, SOLUTION SUBCUTANEOUS
Qty: 1 ML | Refills: 11 | Status: SHIPPED | OUTPATIENT
Start: 2024-12-21

## 2024-12-31 ENCOUNTER — OFFICE VISIT (OUTPATIENT)
Dept: NEUROLOGY | Facility: CLINIC | Age: 49
End: 2024-12-31
Payer: COMMERCIAL

## 2024-12-31 VITALS
DIASTOLIC BLOOD PRESSURE: 80 MMHG | HEIGHT: 58 IN | WEIGHT: 112 LBS | BODY MASS INDEX: 23.51 KG/M2 | SYSTOLIC BLOOD PRESSURE: 110 MMHG

## 2024-12-31 DIAGNOSIS — G43.709 CHRONIC MIGRAINE WITHOUT AURA WITHOUT STATUS MIGRAINOSUS, NOT INTRACTABLE: ICD-10-CM

## 2024-12-31 PROCEDURE — 1159F MED LIST DOCD IN RCRD: CPT | Mod: CPTII,S$GLB,,

## 2024-12-31 PROCEDURE — 3079F DIAST BP 80-89 MM HG: CPT | Mod: CPTII,S$GLB,,

## 2024-12-31 PROCEDURE — 3074F SYST BP LT 130 MM HG: CPT | Mod: CPTII,S$GLB,,

## 2024-12-31 PROCEDURE — 99999 PR PBB SHADOW E&M-EST. PATIENT-LVL III: CPT | Mod: PBBFAC,,,

## 2024-12-31 PROCEDURE — 99213 OFFICE O/P EST LOW 20 MIN: CPT | Mod: S$GLB,,,

## 2024-12-31 PROCEDURE — 1160F RVW MEDS BY RX/DR IN RCRD: CPT | Mod: CPTII,S$GLB,,

## 2024-12-31 PROCEDURE — 3008F BODY MASS INDEX DOCD: CPT | Mod: CPTII,S$GLB,,

## 2024-12-31 RX ORDER — GALCANEZUMAB 120 MG/ML
INJECTION, SOLUTION SUBCUTANEOUS
Qty: 1 ML | Refills: 11 | Status: SHIPPED | OUTPATIENT
Start: 2024-12-31

## 2024-12-31 RX ORDER — SUMATRIPTAN SUCCINATE 100 MG/1
100 TABLET ORAL 2 TIMES DAILY PRN
Qty: 9 TABLET | Refills: 5 | Status: SHIPPED | OUTPATIENT
Start: 2024-12-31

## 2024-12-31 NOTE — PROGRESS NOTES
"  Neurology Clinic    Established Headache Patient  SUBJECTIVE:  Patient ID: Jarred Jason is a 49 y.o. female.    Chief Complaint: migraine follow-up    History of Present Illness:    Patient here for 1-year migraine f/u.   Having 2-3 migraines per month, notices the migraines are more frequent when it's almost time for her next injection.   Emgality is working well for her; "it has been a life-saver".   Reports most recent migraine occurred last week. Migraines start with an aching pain in occipital region, progressing to pulsation then blurred vision and her head "feels like its in a cloud". Takes Imitrex at onset of migraine which is typically helpful for relieving pain    Associated factors WITH HA: Nausea, vomiting, photophobia, phonophobia,  diplopia, blurred vision    Current Preventative:              Emgality monthly                 Current Rescue:              Imitrex 100mg--only taking it about 2-3 times per month    Review of Systems - as per HPI, otherwise pertinent systems review is negative.          Past Medical History:   Diagnosis Date    Migraines        Past Surgical History:   Procedure Laterality Date    AUGMENTATION OF BREAST      BONE MARROW BIOPSY W/ ASPIRATION  07/08/2019    COLONOSCOPY  09/30/2019    ESOPHAGOGASTRODUODENOSCOPY  09/30/2019    with biopsy    HYSTERECTOMY         Family History   Problem Relation Name Age of Onset    Hypertension Mother      Diabetes Mother      Diabetes type II Brother      Hypertension Brother         Social History     Socioeconomic History    Marital status:    Tobacco Use    Smoking status: Former     Types: Cigarettes    Smokeless tobacco: Never   Substance and Sexual Activity    Alcohol use: Not Currently     Comment: Not often     Drug use: Never       Review of patient's allergies indicates:  No Known Allergies    Current Medications:  Current Outpatient Medications   Medication Instructions    EMGALITY  mg/mL PnIj INJECT 120 MG " "UNDER THE SKIN 1 TIME A MONTH    estradioL (ESTRACE) 2 MG tablet No dose, route, or frequency recorded.    folic acid (FOLVITE) 1,000 mcg, Oral, Daily    sumatriptan (IMITREX) 100 mg, Oral, 2 times daily PRN    vitamin E 100 UNIT capsule 100 Int'l Units       OBJECTIVE:  Vitals:  Visit Vitals  /80 (BP Location: Left arm, Patient Position: Sitting)   Ht 4' 10" (1.473 m)   Wt 50.8 kg (112 lb)   BMI 23.41 kg/m²       Physical Exam:  Constitutional: she appears well-developed and well-nourished. she is well groomed.    Accompanied by: self  Head: Normocephalic and atraumatic  Resp:  Pulmonary effort is normal.    Cardiac: RRR, no murmurs  Musculoskeletal: Normal range of motion.   Skin: Skin is warm and dry.  Psychiatric: Normal mood and affect.     Neuro exam:    The patient is alert and oriented   Normal attention and concentration  Speech is clear and fluent   Pupils are equal, round, and reactive to light.    EOMs intact; no nystagmus, no ptosis, no gaze preference or deviation     Visual fields are full to confrontation testing.   No facial asymmetry  CN 8: hearing is grossly normal  Motor: grossly normal; no lateralizing deficits  Sensation: vib intact throughout  Gait: unassisted; posture upright. Gait is steady with normal steps      Review of Data:   Prior notes reviewed       ASSESSMENT /PLAN:    1. Chronic migraine without aura without status migrainosus, not intractable  -     EMGALITY  mg/mL PnIj; INJECT 120 MG UNDER THE SKIN 1 TIME A MONTH  Dispense: 1 mL; Refill: 11  -     sumatriptan (IMITREX) 100 MG tablet; Take 1 tablet (100 mg total) by mouth 2 (two) times daily as needed for Migraine (take at onset of migraine. If no relief after 2 hours take another dose. no more than 2 tablets in 24 hour period).  Dispense: 9 tablet; Refill: 5    Continue monthly Emgality injections and prn Imitrex    Follow-up one year. In addition to their scheduled follow up, the patient has also been instructed to " follow up on as needed basis.   Should headaches change in nature, increase in frequency, or if abortive therapy loses efficacy, they are to contact our office and move up appointment.      Questions and concerns were sought and answered to the patient's stated verbal satisfaction.    The patient verbalizes understanding and agreement with the above stated treatment plan.   Items discussed include acute and/or chronic neurological, sleep, or other issues and their attendant differential diagnoses.  Potential for additional testing, treatment options, and prognosis also discussed.  Dr. Alfonso Ackerman available during encounter.    Flaqiuta Sandoval, MSN, APRN, FNP-C  [facility]  [phone]

## 2025-01-27 DIAGNOSIS — G43.709 CHRONIC MIGRAINE WITHOUT AURA WITHOUT STATUS MIGRAINOSUS, NOT INTRACTABLE: Primary | ICD-10-CM

## 2025-01-27 RX ORDER — SUMATRIPTAN SUCCINATE 100 MG/1
100 TABLET ORAL 2 TIMES DAILY PRN
Qty: 9 TABLET | Refills: 5 | Status: SHIPPED | OUTPATIENT
Start: 2025-01-27

## 2025-06-24 DIAGNOSIS — Z12.31 OTHER SCREENING MAMMOGRAM: Primary | ICD-10-CM

## 2025-07-08 ENCOUNTER — HOSPITAL ENCOUNTER (OUTPATIENT)
Dept: RADIOLOGY | Facility: HOSPITAL | Age: 50
Discharge: HOME | End: 2025-07-08
Attending: OBSTETRICS & GYNECOLOGY
Payer: COMMERCIAL

## 2025-07-08 DIAGNOSIS — Z12.31 OTHER SCREENING MAMMOGRAM: ICD-10-CM

## 2025-07-08 PROCEDURE — 77067 SCR MAMMO BI INCL CAD: CPT | Mod: 26,,, | Performed by: RADIOLOGY

## 2025-07-08 PROCEDURE — 77063 BREAST TOMOSYNTHESIS BI: CPT | Mod: TC

## 2025-07-08 PROCEDURE — 77063 BREAST TOMOSYNTHESIS BI: CPT | Mod: 26,,, | Performed by: RADIOLOGY
